# Patient Record
Sex: FEMALE | Race: WHITE | Employment: FULL TIME | URBAN - METROPOLITAN AREA
[De-identification: names, ages, dates, MRNs, and addresses within clinical notes are randomized per-mention and may not be internally consistent; named-entity substitution may affect disease eponyms.]

---

## 2019-11-04 LAB
HBSAG, EXTERNAL: NEGATIVE
HIV, EXTERNAL: NORMAL
RPR, EXTERNAL: NORMAL
RUBELLA, EXTERNAL: NORMAL

## 2020-05-07 LAB — GRBS, EXTERNAL: NEGATIVE

## 2020-06-07 ENCOUNTER — HOSPITAL ENCOUNTER (INPATIENT)
Age: 25
LOS: 4 days | Discharge: HOME | DRG: 560 | End: 2020-06-11
Attending: OBSTETRICS & GYNECOLOGY | Admitting: OBSTETRICS & GYNECOLOGY
Payer: MEDICAID

## 2020-06-07 PROBLEM — Z3A.40 40 WEEKS GESTATION OF PREGNANCY: Status: ACTIVE | Noted: 2020-06-07

## 2020-06-07 PROBLEM — Z34.90 ENCOUNTER FOR PLANNED INDUCTION OF LABOR: Status: ACTIVE | Noted: 2020-06-07

## 2020-06-07 LAB
ABO + RH BLD: NORMAL
ALBUMIN SERPL-MCNC: 2.7 G/DL (ref 3.5–5)
ALBUMIN/GLOB SERPL: 0.7 {RATIO} (ref 1.2–3.5)
ALP SERPL-CCNC: 166 U/L (ref 50–136)
ALT SERPL-CCNC: 11 U/L (ref 12–65)
ANION GAP SERPL CALC-SCNC: 8 MMOL/L (ref 7–16)
AST SERPL-CCNC: 11 U/L (ref 15–37)
BASOPHILS # BLD: 0 K/UL (ref 0–0.2)
BASOPHILS NFR BLD: 0 % (ref 0–2)
BILIRUB SERPL-MCNC: 0.3 MG/DL (ref 0.2–1.1)
BLOOD GROUP ANTIBODIES SERPL: NORMAL
BUN SERPL-MCNC: 9 MG/DL (ref 6–23)
CALCIUM SERPL-MCNC: 8.7 MG/DL (ref 8.3–10.4)
CHLORIDE SERPL-SCNC: 107 MMOL/L (ref 98–107)
CO2 SERPL-SCNC: 21 MMOL/L (ref 21–32)
CREAT SERPL-MCNC: 0.56 MG/DL (ref 0.6–1)
DIFFERENTIAL METHOD BLD: ABNORMAL
EOSINOPHIL # BLD: 0.1 K/UL (ref 0–0.8)
EOSINOPHIL NFR BLD: 1 % (ref 0.5–7.8)
ERYTHROCYTE [DISTWIDTH] IN BLOOD BY AUTOMATED COUNT: 13 % (ref 11.9–14.6)
GLOBULIN SER CALC-MCNC: 3.7 G/DL (ref 2.3–3.5)
GLUCOSE SERPL-MCNC: 105 MG/DL (ref 65–100)
HCT VFR BLD AUTO: 32.6 % (ref 35.8–46.3)
HGB BLD-MCNC: 10.9 G/DL (ref 11.7–15.4)
IMM GRANULOCYTES # BLD AUTO: 0.1 K/UL (ref 0–0.5)
IMM GRANULOCYTES NFR BLD AUTO: 1 % (ref 0–5)
LYMPHOCYTES # BLD: 2.1 K/UL (ref 0.5–4.6)
LYMPHOCYTES NFR BLD: 20 % (ref 13–44)
MCH RBC QN AUTO: 30.3 PG (ref 26.1–32.9)
MCHC RBC AUTO-ENTMCNC: 33.4 G/DL (ref 31.4–35)
MCV RBC AUTO: 90.6 FL (ref 79.6–97.8)
MONOCYTES # BLD: 0.9 K/UL (ref 0.1–1.3)
MONOCYTES NFR BLD: 8 % (ref 4–12)
NEUTS SEG # BLD: 7.6 K/UL (ref 1.7–8.2)
NEUTS SEG NFR BLD: 71 % (ref 43–78)
NRBC # BLD: 0 K/UL (ref 0–0.2)
PLATELET # BLD AUTO: 218 K/UL (ref 150–450)
PMV BLD AUTO: 11.6 FL (ref 9.4–12.3)
POTASSIUM SERPL-SCNC: 3.7 MMOL/L (ref 3.5–5.1)
PROT SERPL-MCNC: 6.4 G/DL (ref 6.3–8.2)
RBC # BLD AUTO: 3.6 M/UL (ref 4.05–5.2)
SODIUM SERPL-SCNC: 136 MMOL/L (ref 136–145)
SPECIMEN EXP DATE BLD: NORMAL
WBC # BLD AUTO: 10.8 K/UL (ref 4.3–11.1)

## 2020-06-07 PROCEDURE — 65270000029 HC RM PRIVATE

## 2020-06-07 PROCEDURE — 86900 BLOOD TYPING SEROLOGIC ABO: CPT

## 2020-06-07 PROCEDURE — 3E0P7VZ INTRODUCTION OF HORMONE INTO FEMALE REPRODUCTIVE, VIA NATURAL OR ARTIFICIAL OPENING: ICD-10-PCS | Performed by: OBSTETRICS & GYNECOLOGY

## 2020-06-07 PROCEDURE — 36415 COLL VENOUS BLD VENIPUNCTURE: CPT

## 2020-06-07 PROCEDURE — 80053 COMPREHEN METABOLIC PANEL: CPT

## 2020-06-07 PROCEDURE — 74011250637 HC RX REV CODE- 250/637: Performed by: OBSTETRICS & GYNECOLOGY

## 2020-06-07 PROCEDURE — 85025 COMPLETE CBC W/AUTO DIFF WBC: CPT

## 2020-06-07 RX ORDER — BUTORPHANOL TARTRATE 2 MG/ML
1 INJECTION INTRAMUSCULAR; INTRAVENOUS
Status: DISCONTINUED | OUTPATIENT
Start: 2020-06-07 | End: 2020-06-10 | Stop reason: HOSPADM

## 2020-06-07 RX ORDER — SODIUM CHLORIDE 0.9 % (FLUSH) 0.9 %
5-40 SYRINGE (ML) INJECTION EVERY 8 HOURS
Status: DISCONTINUED | OUTPATIENT
Start: 2020-06-07 | End: 2020-06-10 | Stop reason: ALTCHOICE

## 2020-06-07 RX ORDER — SODIUM CHLORIDE 0.9 % (FLUSH) 0.9 %
5-40 SYRINGE (ML) INJECTION AS NEEDED
Status: DISCONTINUED | OUTPATIENT
Start: 2020-06-07 | End: 2020-06-10 | Stop reason: ALTCHOICE

## 2020-06-07 RX ORDER — LIDOCAINE HYDROCHLORIDE 20 MG/ML
JELLY TOPICAL
Status: ACTIVE | OUTPATIENT
Start: 2020-06-07 | End: 2020-06-08

## 2020-06-07 RX ORDER — LIDOCAINE HYDROCHLORIDE 10 MG/ML
1 INJECTION INFILTRATION; PERINEURAL
Status: ACTIVE | OUTPATIENT
Start: 2020-06-07 | End: 2020-06-08

## 2020-06-07 RX ORDER — DEXTROSE, SODIUM CHLORIDE, SODIUM LACTATE, POTASSIUM CHLORIDE, AND CALCIUM CHLORIDE 5; .6; .31; .03; .02 G/100ML; G/100ML; G/100ML; G/100ML; G/100ML
125 INJECTION, SOLUTION INTRAVENOUS CONTINUOUS
Status: DISCONTINUED | OUTPATIENT
Start: 2020-06-07 | End: 2020-06-10 | Stop reason: ALTCHOICE

## 2020-06-07 RX ORDER — MINERAL OIL
120 OIL (ML) ORAL
Status: ACTIVE | OUTPATIENT
Start: 2020-06-07 | End: 2020-06-08

## 2020-06-07 RX ORDER — OXYTOCIN/RINGER'S LACTATE 30/500 ML
250 PLASTIC BAG, INJECTION (ML) INTRAVENOUS ONCE
Status: ACTIVE | OUTPATIENT
Start: 2020-06-07 | End: 2020-06-08

## 2020-06-07 RX ORDER — ONDANSETRON HYDROCHLORIDE 8 MG/1
8 TABLET, FILM COATED ORAL
COMMUNITY

## 2020-06-07 RX ORDER — OXYTOCIN/RINGER'S LACTATE 30/500 ML
0-25 PLASTIC BAG, INJECTION (ML) INTRAVENOUS
Status: DISCONTINUED | OUTPATIENT
Start: 2020-06-07 | End: 2020-06-10 | Stop reason: HOSPADM

## 2020-06-07 RX ADMIN — MISOPROSTOL 25 MCG: 100 TABLET ORAL at 20:42

## 2020-06-07 NOTE — PROGRESS NOTES
Pt admitted to room 430 for cytotec induction. Consents signed, iv started and admission complete. Report given to Dena Gabriel RN, care relinquished.

## 2020-06-08 PROBLEM — O48.0 POST-TERM PREGNANCY, 40-42 WEEKS OF GESTATION: Status: ACTIVE | Noted: 2020-06-08

## 2020-06-08 PROCEDURE — 74011250637 HC RX REV CODE- 250/637: Performed by: OBSTETRICS & GYNECOLOGY

## 2020-06-08 PROCEDURE — 74011250636 HC RX REV CODE- 250/636: Performed by: OBSTETRICS & GYNECOLOGY

## 2020-06-08 PROCEDURE — 74011000250 HC RX REV CODE- 250: Performed by: OBSTETRICS & GYNECOLOGY

## 2020-06-08 PROCEDURE — 65270000029 HC RM PRIVATE

## 2020-06-08 RX ORDER — ZOLPIDEM TARTRATE 5 MG/1
5 TABLET ORAL
Status: DISCONTINUED | OUTPATIENT
Start: 2020-06-08 | End: 2020-06-11 | Stop reason: HOSPADM

## 2020-06-08 RX ADMIN — MISOPROSTOL 25 MCG: 100 TABLET ORAL at 00:48

## 2020-06-08 RX ADMIN — MISOPROSTOL 50 MCG: 100 TABLET ORAL at 23:37

## 2020-06-08 RX ADMIN — PROMETHAZINE HYDROCHLORIDE 12.5 MG: 25 INJECTION INTRAMUSCULAR; INTRAVENOUS at 21:04

## 2020-06-08 RX ADMIN — SODIUM CHLORIDE, SODIUM LACTATE, POTASSIUM CHLORIDE, CALCIUM CHLORIDE, AND DEXTROSE MONOHYDRATE 125 ML/HR: 600; 310; 30; 20; 5 INJECTION, SOLUTION INTRAVENOUS at 08:58

## 2020-06-08 RX ADMIN — MISOPROSTOL 50 MCG: 100 TABLET ORAL at 19:21

## 2020-06-08 RX ADMIN — Medication 2 MILLI-UNITS/MIN: at 08:58

## 2020-06-08 RX ADMIN — MISOPROSTOL 50 MCG: 100 TABLET ORAL at 05:00

## 2020-06-08 RX ADMIN — BUTORPHANOL TARTRATE 1 MG: 2 INJECTION, SOLUTION INTRAMUSCULAR; INTRAVENOUS at 16:20

## 2020-06-08 RX ADMIN — SODIUM CHLORIDE, SODIUM LACTATE, POTASSIUM CHLORIDE, CALCIUM CHLORIDE, AND DEXTROSE MONOHYDRATE 125 ML/HR: 600; 310; 30; 20; 5 INJECTION, SOLUTION INTRAVENOUS at 16:27

## 2020-06-08 NOTE — PROGRESS NOTES
Labor Progress Note  Patient seen, fetal heart rate and contraction pattern evaluated, patient examined. Patient Vitals for the past 1 hrs:   BP Pulse   06/08/20 1230 112/60 (!) 53       Physical Exam:  Cervical Exam:  0 cm dilated    90% effaced    -2 station    Membranes:  Intact  Uterine Activity: Intensity: mild  Fetal Heart Rate: Reactive    Assessment/Plan:  Reassuring fetal status, Continue plan for vaginal delivery, Stil unalbe to get inside os to AROM. Will continue increasing pitocin, If no change at 5, will stop pitocin, options of C/S verse second day discussed.   Wanting a second day, would have her eat dinner and then repeat Cytotec over night to resume pitocin in am.

## 2020-06-08 NOTE — PROGRESS NOTES
FHTs not tracing well, maternal position change, ultrasound adjusted frequently. Patient c/o cramping pain and requests pain medication. SVE tight 1. See flowsheet.

## 2020-06-08 NOTE — PROGRESS NOTES
8061 Dr. Cali Dunn updated on patient status, SVE per flowsheet, pitocin per [facility], UCs per strip, patient request for pain medication. MD ok with patient to receive Stadol. POC to be discussed with patient if patient wishes to continue with pitocin or d/c pitocin at 1700, eat dinner and restart cytotec as discussed earlier,  per MD order. 1620 Stadol given, see MAR.     1720 POC discussed with patient and significant other. Patient wishes to d/c pitocin, eat dinner, and restart cytotec tonight. 1722 Pitocin d/c. See MAR. Patient will order dinner. 26 Dr. Cali Dunn updated on patient status and patient decision.  MD orders received for cytotec 50mcg x3 starting at 1900 and pitocin to be started at 0700am.

## 2020-06-08 NOTE — PROGRESS NOTES
Cytotec 25 mcg given buccal as per order. Patient denies feeling any contractions or pain. Call bell within reach of patient. Patient denies any further needs at this time.

## 2020-06-08 NOTE — H&P
History & Physical    Name: Tariq Hammonds MRN: [id number]  SSN: [id number]    YOB: 1995  Age: 27 y.o. Sex: female      Subjective:     Estimated Date of Delivery: 20  OB History    Para Term  AB Living   1             SAB TAB Ectopic Molar Multiple Live Births                    # Outcome Date GA Lbr Jimmie/2nd Weight Sex Delivery Anes PTL Lv   1 Current                Ms. Sariah Chauhan is admitted with pregnancy at 40w4d for induction of labor due to post dates. Prenatal course was normal.  Please see prenatal records for details. Past Medical History:   Diagnosis Date    Psychiatric problem      History reviewed. No pertinent surgical history. Social History     Occupational History    Not on file   Tobacco Use    Smoking status: Current Every Day Smoker     Packs/day: 0.25   Substance and Sexual Activity    Alcohol use: Not Currently    Drug use: Never    Sexual activity: Yes     History reviewed. No pertinent family history. Allergies   Allergen Reactions    Bactrim [Sulfamethoprim] Rash     Prior to Admission medications    Medication Sig Start Date End Date Taking? Authorizing Provider   ondansetron hcl (Zofran) 8 mg tablet Take 8 mg by mouth every eight (8) hours as needed for Nausea or Vomiting. Yes Provider, Historical   PNV No12-Iron-FA-DSS-OM-3 29 mg iron-1 mg -50 mg CPKD Take  by mouth. Provider, Historical        Review of Systems: A comprehensive review of systems was negative except for that written in the History of Present Illness. Objective:     Vitals:  Vitals:    20 1843 20 1845 20 1910 20 0501   BP:  108/65 111/62 112/61   Pulse:  (!) 105 95 62   Resp:  16 16 16   Temp:  98.5 °F (36.9 °C) 98.3 °F (36.8 °C) 98.3 °F (36.8 °C)   Weight: 93.9 kg (207 lb)      Height: 5' 7\" (1.702 m)           Physical Exam:  Patient without distress.   Heart: Regular rate and rhythm  Lung: clear to auscultation throughout lung fields, no wheezes, no rales, no rhonchi and normal respiratory effort  Abdomen: soft, nontender  Cervical Exam: 0 cm dilated    70% effaced    -3 station    Membranes:  Intact  Fetal Heart Rate: Reactive          Prenatal Labs:   Lab Results   Component Value Date/Time    ABO/Rh(D) O POSITIVE 06/07/2020 06:58 PM    Rubella, External Immune 11/04/2019    HBsAg, External Negative 11/04/2019    HIV, External Non-Reactive 11/04/2019    RPR, External Non-Reactive 11/04/2019    GrBStrep, External Negative 05/07/2020       Impression/Plan:     Principal Problem:    Encounter for planned induction of labor (6/7/2020)    Active Problems:    40 weeks gestation of pregnancy (6/7/2020)      Post-term pregnancy, 40-42 weeks of gestation (6/8/2020)         Plan: Admit for induction of labor. Group B Strep negative.

## 2020-06-08 NOTE — PROGRESS NOTES
Dr. Virgilio Cedeño at [facility] No further orders received at this time.

## 2020-06-09 ENCOUNTER — ANESTHESIA (OUTPATIENT)
Dept: LABOR AND DELIVERY | Age: 25
DRG: 560 | End: 2020-06-09
Payer: MEDICAID

## 2020-06-09 ENCOUNTER — ANESTHESIA EVENT (OUTPATIENT)
Dept: LABOR AND DELIVERY | Age: 25
DRG: 560 | End: 2020-06-09
Payer: MEDICAID

## 2020-06-09 PROCEDURE — 10907ZC DRAINAGE OF AMNIOTIC FLUID, THERAPEUTIC FROM PRODUCTS OF CONCEPTION, VIA NATURAL OR ARTIFICIAL OPENING: ICD-10-PCS | Performed by: OBSTETRICS & GYNECOLOGY

## 2020-06-09 PROCEDURE — 74011000250 HC RX REV CODE- 250: Performed by: OBSTETRICS & GYNECOLOGY

## 2020-06-09 PROCEDURE — A4300 CATH IMPL VASC ACCESS PORTAL: HCPCS | Performed by: NURSE ANESTHETIST, CERTIFIED REGISTERED

## 2020-06-09 PROCEDURE — 77030014125 HC TY EPDRL BBMI -B: Performed by: NURSE ANESTHETIST, CERTIFIED REGISTERED

## 2020-06-09 PROCEDURE — 65270000029 HC RM PRIVATE

## 2020-06-09 PROCEDURE — 74011000250 HC RX REV CODE- 250: Performed by: ANESTHESIOLOGY

## 2020-06-09 PROCEDURE — 77030019905 HC CATH URETH INTMIT MDII -A

## 2020-06-09 PROCEDURE — 74011250636 HC RX REV CODE- 250/636: Performed by: OBSTETRICS & GYNECOLOGY

## 2020-06-09 PROCEDURE — 74011250636 HC RX REV CODE- 250/636: Performed by: NURSE ANESTHETIST, CERTIFIED REGISTERED

## 2020-06-09 PROCEDURE — 74011250637 HC RX REV CODE- 250/637: Performed by: OBSTETRICS & GYNECOLOGY

## 2020-06-09 PROCEDURE — 3E0P7VZ INTRODUCTION OF HORMONE INTO FEMALE REPRODUCTIVE, VIA NATURAL OR ARTIFICIAL OPENING: ICD-10-PCS | Performed by: OBSTETRICS & GYNECOLOGY

## 2020-06-09 RX ORDER — FAMOTIDINE 20 MG/1
20 TABLET, FILM COATED ORAL ONCE
Status: ACTIVE | OUTPATIENT
Start: 2020-06-09 | End: 2020-06-10

## 2020-06-09 RX ORDER — SODIUM CHLORIDE 0.9 % (FLUSH) 0.9 %
5-40 SYRINGE (ML) INJECTION AS NEEDED
Status: DISCONTINUED | OUTPATIENT
Start: 2020-06-09 | End: 2020-06-10 | Stop reason: HOSPADM

## 2020-06-09 RX ORDER — FENTANYL CITRATE 50 UG/ML
INJECTION, SOLUTION INTRAMUSCULAR; INTRAVENOUS AS NEEDED
Status: DISCONTINUED | OUTPATIENT
Start: 2020-06-09 | End: 2020-06-10 | Stop reason: HOSPADM

## 2020-06-09 RX ORDER — ROPIVACAINE HYDROCHLORIDE 2 MG/ML
INJECTION, SOLUTION EPIDURAL; INFILTRATION; PERINEURAL
Status: DISCONTINUED | OUTPATIENT
Start: 2020-06-09 | End: 2020-06-10 | Stop reason: HOSPADM

## 2020-06-09 RX ORDER — LIDOCAINE HYDROCHLORIDE AND EPINEPHRINE 15; 5 MG/ML; UG/ML
INJECTION, SOLUTION EPIDURAL
Status: COMPLETED | OUTPATIENT
Start: 2020-06-09 | End: 2020-06-09

## 2020-06-09 RX ORDER — SODIUM CHLORIDE 0.9 % (FLUSH) 0.9 %
5-40 SYRINGE (ML) INJECTION EVERY 8 HOURS
Status: DISCONTINUED | OUTPATIENT
Start: 2020-06-09 | End: 2020-06-10 | Stop reason: HOSPADM

## 2020-06-09 RX ADMIN — PROMETHAZINE HYDROCHLORIDE 12.5 MG: 25 INJECTION INTRAMUSCULAR; INTRAVENOUS at 02:58

## 2020-06-09 RX ADMIN — Medication 18 MILLI-UNITS/MIN: at 21:35

## 2020-06-09 RX ADMIN — SODIUM CHLORIDE, SODIUM LACTATE, POTASSIUM CHLORIDE, CALCIUM CHLORIDE, AND DEXTROSE MONOHYDRATE 125 ML/HR: 600; 310; 30; 20; 5 INJECTION, SOLUTION INTRAVENOUS at 07:59

## 2020-06-09 RX ADMIN — PROMETHAZINE HYDROCHLORIDE 12.5 MG: 25 INJECTION INTRAMUSCULAR; INTRAVENOUS at 10:36

## 2020-06-09 RX ADMIN — LIDOCAINE HYDROCHLORIDE,EPINEPHRINE BITARTRATE 5 ML: 15; .005 INJECTION, SOLUTION EPIDURAL; INFILTRATION; INTRACAUDAL; PERINEURAL at 13:29

## 2020-06-09 RX ADMIN — ROPIVACAINE HYDROCHLORIDE 10 ML/HR: 2 INJECTION, SOLUTION EPIDURAL; INFILTRATION at 13:30

## 2020-06-09 RX ADMIN — FENTANYL CITRATE 100 MCG: 50 INJECTION INTRAMUSCULAR; INTRAVENOUS at 23:57

## 2020-06-09 RX ADMIN — MISOPROSTOL 50 MCG: 100 TABLET ORAL at 04:06

## 2020-06-09 RX ADMIN — SODIUM CHLORIDE, SODIUM LACTATE, POTASSIUM CHLORIDE, CALCIUM CHLORIDE, AND DEXTROSE MONOHYDRATE 125 ML/HR: 600; 310; 30; 20; 5 INJECTION, SOLUTION INTRAVENOUS at 15:52

## 2020-06-09 RX ADMIN — Medication 2 MILLI-UNITS/MIN: at 07:59

## 2020-06-09 NOTE — PROGRESS NOTES
MD messaged for additonal nausea medicaton for patient continued vomiting.  Awaiting return communication

## 2020-06-09 NOTE — PROGRESS NOTES
Labor Progress Note  Patient seen, fetal heart rate and contraction pattern evaluated, patient examined. Starting her second day IOL. Little response from the cytotec. Have resume Pitocin. No data found. Physical Exam:  Cervical Exam:  1.5 cm dilated    70% effaced    -2-3 station    Membranes:  Artificial Rupture of Membranes;  Amniotic Fluid: medium amount of clear fluid  Uterine Activity: Intensity: mild  Fetal Heart Rate: Reactive    Assessment/Plan:  Reassuring fetal status, Continue plan for vaginal delivery

## 2020-06-09 NOTE — PROGRESS NOTES
Shift assessment complete, see flowsheet for complete assessment. Abdomen palpated soft and non-tender. +FM. POC discussed with patient, verbalizes understanding and agreement. Denies any needs, questions, or concerns. Call light within reach. Patient resting in bed with significant other at bedside.

## 2020-06-09 NOTE — PROGRESS NOTES
Wes Lr at bedside at 1320. PRIYANKA Wood at bedside at 1320    Assisted pt to sitting up on bedside at 1320. Timeout completed at 36 with MD, PRIYANKA and myself at bedside. Test dose given at 1327. Negative reaction. Dose given at 1331. Pt assisted to lying back in left tilt position. See anesthesia record for details. See vital sign flow sheet for BP. Tolerated procedure well.

## 2020-06-09 NOTE — PROGRESS NOTES
Received care of pt from [address] [place] [address]. Plan of care reviewed, verbalizes understanding.

## 2020-06-09 NOTE — ANESTHESIA PREPROCEDURE EVALUATION
Relevant Problems   No relevant active problems       Anesthetic History               Review of Systems / Medical History  Patient summary reviewed, nursing notes reviewed and pertinent labs reviewed    Pulmonary                   Neuro/Psych              Cardiovascular                  Exercise tolerance: >4 METS     GI/Hepatic/Renal                Endo/Other             Other Findings              Physical Exam    Airway  Mallampati: II  TM Distance: 4 - 6 cm  Neck ROM: normal range of motion   Mouth opening: Normal     Cardiovascular  Regular rate and rhythm,  S1 and S2 normal,  no murmur, click, rub, or gallop             Dental  No notable dental hx       Pulmonary  Breath sounds clear to auscultation               Abdominal         Other Findings            Anesthetic Plan    ASA: 2  Anesthesia type: epidural          Induction: Intravenous  Anesthetic plan and risks discussed with: Patient

## 2020-06-09 NOTE — PROGRESS NOTES
Dr Radha Uriarte at nurses station. Strip reviewed. MD notified of [place] 2/80/-2. Orders for epidural.  IV bolus infusing.

## 2020-06-09 NOTE — PROGRESS NOTES
SBAR report received from Saeid Richard RN. Patient care assumed. Patient sleeping in bed with significant other at bedside.

## 2020-06-09 NOTE — PROGRESS NOTES
Dr. Andres Melchor updated on patient status, SVE per flowsheet, pitocin per [facility], FHTs per strip, maternal position changes, LR fluid bolus given. MD reviewed strip. Orders received to keep patient on 16 mu/min of pitocin per MAR.

## 2020-06-09 NOTE — PROGRESS NOTES
Dr. Radha Uriarte at bedside, strip reviewed by MD. EDENILSON per MD with clear fluid.  SVE 1.5/70 per MD.

## 2020-06-09 NOTE — PROGRESS NOTES
Shift assessment completed per flow sheet. Pt denies complaints of LOF, vaginal bleeding,  HA, epigastric pain, blurred vision. See flowsheet for details. POC reviewed with pt and pt verbalized understanding. Pt oriented to room and has call light within reach. Pt denies any needs at this time but will call out PRN. Pt now resting in bed with family at bedside.

## 2020-06-09 NOTE — ANESTHESIA PROCEDURE NOTES
Epidural Block    Start time: 6/9/2020 1:19 PM  End time: 6/9/2020 1:26 PM  Performed by: Armand Sutton MD  Authorized by: Armand Sutton MD     Pre-Procedure  Indication: labor epidural    Preanesthetic Checklist: patient identified, risks and benefits discussed, anesthesia consent, site marked, patient being monitored, timeout performed and anesthesia consent    Timeout Time: 13:19        Epidural:   Patient position:  Seated  Prep region:  Lumbar  Prep: Chlorhexidine    Location:  L3-4    Needle and Epidural Catheter:   Needle Type:  Tuohy  Needle Gauge:  17 G  Injection Technique:  Loss of resistance using air  Attempts:  1  Catheter Size:  18 G  Depth in Epidural Space (cm):  3  Events: no blood with aspiration, no cerebrospinal fluid with aspiration, no paresthesia and negative aspiration test    Test Dose:  Negative (.75% LIDOCAINE WITH EPI)    Assessment:   Catheter Secured:  Tegaderm and tape  Insertion:  Uncomplicated  Patient tolerance:  Patient tolerated the procedure well with no immediate complications

## 2020-06-09 NOTE — PROGRESS NOTES
Labor Progress Note  Patient seen, fetal heart rate and contraction pattern evaluated, patient examined. Patient Vitals for the past 1 hrs:   BP Temp Pulse Resp   06/09/20 1249 114/63  (!) 56    06/09/20 1233 113/58  81    06/09/20 1217 104/55  60    06/09/20 1202 106/54 98.7 °F (37.1 °C) (!) 58 20       Physical Exam:  Cervical Exam:  2 cm dilated    80% effaced    -2 station    Membranes:  Artificial Rupture of Membranes;  Amniotic Fluid: medium amount of clear fluid  Uterine Activity: Intensity: moderate  Fetal Heart Rate: Reactive    Assessment/Plan:  Reassuring fetal status, Continue plan for vaginal delivery

## 2020-06-09 NOTE — PROGRESS NOTES
Dr. Andres Melchor updated on patient status, SVE per flowsheet, FHTs per strip with minimal-moderate variability. No new orders received.

## 2020-06-09 NOTE — PROGRESS NOTES
Labor Progress Note  Patient seen, fetal heart rate and contraction pattern evaluated, patient examined. No data found. Physical Exam:  Cervical Exam:  6 cm dilated    100% effaced    0 station    Membranes:  Artificial Rupture of Membranes;  Amniotic Fluid: medium amount of clear fluid  Uterine Activity: Intensity: strong  Fetal Heart Rate: Reactive  Decelerations: variable  Cat 2, +FSS, progressing,     Assessment/Plan:  Reassuring fetal status, Continue plan for vaginal delivery

## 2020-06-10 LAB
ARTERIAL PATENCY WRIST A: ABNORMAL
ARTERIAL PATENCY WRIST A: ABNORMAL
BASE DEFICIT BLD-SCNC: 4 MMOL/L
BASE DEFICIT BLD-SCNC: 4 MMOL/L
BDY SITE: ABNORMAL
BDY SITE: ABNORMAL
CA-I BLD-MCNC: 1.69 MMOL/L (ref 1.12–1.32)
CA-I BLD-MCNC: 1.71 MMOL/L (ref 1.12–1.32)
COLLECT TIME,HTIME: 227
COLLECT TIME,HTIME: 227
GAS FLOW.O2 O2 DELIVERY SYS: ABNORMAL L/MIN
GAS FLOW.O2 O2 DELIVERY SYS: ABNORMAL L/MIN
GLUCOSE BLD STRIP.AUTO-MCNC: 105 MG/DL (ref 65–100)
GLUCOSE BLD STRIP.AUTO-MCNC: 86 MG/DL (ref 65–100)
HCO3 BLD-SCNC: 20.6 MMOL/L (ref 22–26)
HCO3 BLD-SCNC: 24.8 MMOL/L (ref 22–26)
O2/TOTAL GAS SETTING VFR VENT: 21 %
O2/TOTAL GAS SETTING VFR VENT: 21 %
PCO2 BLD: 37.2 MMHG (ref 35–45)
PCO2 BLD: 55.8 MMHG (ref 35–45)
PH BLD: 7.26 [PH] (ref 7.35–7.45)
PH BLD: 7.35 [PH] (ref 7.35–7.45)
PO2 BLD: 10 MMHG (ref 75–100)
PO2 BLD: 26 MMHG (ref 75–100)
POTASSIUM BLD-SCNC: 5.1 MMOL/L (ref 3.5–5.1)
POTASSIUM BLD-SCNC: 6.4 MMOL/L (ref 3.5–5.1)
SAO2 % BLD: 45 % (ref 95–98)
SAO2 % BLD: 8 % (ref 95–98)
SERVICE CMNT-IMP: ABNORMAL
SERVICE CMNT-IMP: ABNORMAL
SODIUM BLD-SCNC: 134 MMOL/L (ref 136–145)
SODIUM BLD-SCNC: 134 MMOL/L (ref 136–145)
SPECIMEN TYPE: ABNORMAL
SPECIMEN TYPE: ABNORMAL

## 2020-06-10 PROCEDURE — 76060000078 HC EPIDURAL ANESTHESIA

## 2020-06-10 PROCEDURE — 75410000003 HC RECOV DEL/VAG/CSECN EA 0.5 HR

## 2020-06-10 PROCEDURE — 0KQM0ZZ REPAIR PERINEUM MUSCLE, OPEN APPROACH: ICD-10-PCS | Performed by: OBSTETRICS & GYNECOLOGY

## 2020-06-10 PROCEDURE — 65270000029 HC RM PRIVATE

## 2020-06-10 PROCEDURE — 82803 BLOOD GASES ANY COMBINATION: CPT

## 2020-06-10 PROCEDURE — 77030019905 HC CATH URETH INTMIT MDII -A

## 2020-06-10 PROCEDURE — 82947 ASSAY GLUCOSE BLOOD QUANT: CPT

## 2020-06-10 PROCEDURE — 77030002888 HC SUT CHRMC J&J -A

## 2020-06-10 PROCEDURE — 74011250637 HC RX REV CODE- 250/637: Performed by: OBSTETRICS & GYNECOLOGY

## 2020-06-10 PROCEDURE — 77030018846 HC SOL IRR STRL H20 ICUM -A

## 2020-06-10 PROCEDURE — 77030003028 HC SUT VCRL J&J -A

## 2020-06-10 PROCEDURE — 75410000002 HC LABOR FEE PER 1 HR

## 2020-06-10 PROCEDURE — 75410000000 HC DELIVERY VAGINAL/SINGLE

## 2020-06-10 RX ORDER — SIMETHICONE 80 MG
80 TABLET,CHEWABLE ORAL
Status: DISCONTINUED | OUTPATIENT
Start: 2020-06-10 | End: 2020-06-11 | Stop reason: HOSPADM

## 2020-06-10 RX ORDER — MINERAL OIL
120 OIL (ML) ORAL AS NEEDED
Status: DISCONTINUED | OUTPATIENT
Start: 2020-06-10 | End: 2020-06-10 | Stop reason: ALTCHOICE

## 2020-06-10 RX ORDER — IBUPROFEN 600 MG/1
600 TABLET ORAL
Status: DISCONTINUED | OUTPATIENT
Start: 2020-06-10 | End: 2020-06-11 | Stop reason: HOSPADM

## 2020-06-10 RX ORDER — OXYCODONE AND ACETAMINOPHEN 5; 325 MG/1; MG/1
2 TABLET ORAL
Status: DISCONTINUED | OUTPATIENT
Start: 2020-06-10 | End: 2020-06-11 | Stop reason: HOSPADM

## 2020-06-10 RX ORDER — DIPHENHYDRAMINE HCL 25 MG
25 CAPSULE ORAL
Status: DISCONTINUED | OUTPATIENT
Start: 2020-06-10 | End: 2020-06-11 | Stop reason: HOSPADM

## 2020-06-10 RX ORDER — OXYCODONE AND ACETAMINOPHEN 5; 325 MG/1; MG/1
1 TABLET ORAL
Status: DISCONTINUED | OUTPATIENT
Start: 2020-06-10 | End: 2020-06-11 | Stop reason: HOSPADM

## 2020-06-10 RX ORDER — ONDANSETRON 4 MG/1
4 TABLET, ORALLY DISINTEGRATING ORAL
Status: ACTIVE | OUTPATIENT
Start: 2020-06-10 | End: 2020-06-11

## 2020-06-10 RX ORDER — DOCUSATE SODIUM 100 MG/1
100 CAPSULE, LIQUID FILLED ORAL 2 TIMES DAILY
Status: DISCONTINUED | OUTPATIENT
Start: 2020-06-10 | End: 2020-06-11 | Stop reason: HOSPADM

## 2020-06-10 RX ADMIN — OXYCODONE HYDROCHLORIDE AND ACETAMINOPHEN 1 TABLET: 5; 325 TABLET ORAL at 17:16

## 2020-06-10 RX ADMIN — IBUPROFEN 600 MG: 600 TABLET, FILM COATED ORAL at 12:01

## 2020-06-10 RX ADMIN — MINERAL OIL 120 ML: 471.95 OIL ORAL at 01:45

## 2020-06-10 RX ADMIN — DOCUSATE SODIUM 100 MG: 100 CAPSULE, LIQUID FILLED ORAL at 17:16

## 2020-06-10 RX ADMIN — WITCH HAZEL 1 PAD: 500 SOLUTION RECTAL; TOPICAL at 17:16

## 2020-06-10 RX ADMIN — Medication 1 SPRAY: at 17:16

## 2020-06-10 RX ADMIN — IBUPROFEN 600 MG: 600 TABLET, FILM COATED ORAL at 19:38

## 2020-06-10 RX ADMIN — DOCUSATE SODIUM 100 MG: 100 CAPSULE, LIQUID FILLED ORAL at 12:01

## 2020-06-10 RX ADMIN — IBUPROFEN 600 MG: 600 TABLET, FILM COATED ORAL at 05:56

## 2020-06-10 NOTE — PROGRESS NOTES
SBAR OUT Report: Mother    Verbal report given to Shae Kennedy (full name & credentials) on this patient, who is now being transferred to MIU (unit) for routine progression of care. The patient is not wearing a green \"Anesthesia-Duramorph\" band. Report consisted of patient's Situation, Background, Assessment and Recommendations (SBAR).  ID bands were compared with the identification form, and verified with the patient and receiving nurse. Information from the SBAR, Kardex, Procedure Summary and Intake/Output and the [place] Report was reviewed with the receiving nurse; opportunity for questions and clarification provided. Fundal Assessment done with Vida Montalvo RN.

## 2020-06-10 NOTE — ANESTHESIA POSTPROCEDURE EVALUATION
* No procedures listed *.    epidural    Anesthesia Post Evaluation      Multimodal analgesia: multimodal analgesia used between 6 hours prior to anesthesia start to PACU discharge  Patient location during evaluation: PACU  Patient participation: complete - patient participated  Level of consciousness: awake and awake and alert  Pain management: adequate  Airway patency: patent  Anesthetic complications: no  Cardiovascular status: acceptable  Respiratory status: acceptable  Hydration status: acceptable  Post anesthesia nausea and vomiting:  controlled      INITIAL Post-op Vital signs: No vitals data found for the desired time range.

## 2020-06-10 NOTE — PROGRESS NOTES
Patient left side feeling more comfortable at this time. Patient needs a peanut break per patient. Sat in thrown position at this time. Will continue to monitor the patient.

## 2020-06-10 NOTE — PROGRESS NOTES
Labor Progress Note  Patient seen, fetal heart rate and contraction pattern evaluated, patient examined. Patient Vitals for the past 1 hrs:   BP Temp Pulse Resp   06/09/20 2214 110/70 98.3 °F (36.8 °C) 74 18   06/09/20 2159 112/59  (!) 51        Physical Exam:  Cervical Exam:  8 cm dilated    100% effaced    0 station    Membranes:  Artificial Rupture of Membranes;  Amniotic Fluid: medium amount of clear fluid  Uterine Activity: Intensity: strong  Fetal Heart Rate: Reactive, +FSS    Assessment/Plan:  Reassuring fetal status, Continue plan for vaginal delivery

## 2020-06-10 NOTE — LACTATION NOTE
This note was copied from a baby's chart. Lactation visit. Baby just 15 hours old. Has not latched yet. Attempted a couple of times. No latch. Also tried bottle feeding, no success. Baby has been spitting up and showing no interest in feeding yet per mom and RN. Discussed first 24 hour expectations with mom. Mom states that if baby didn't latch well, she intended to pump once home. Mom has a Spectra personal pump for home use, not here. Information sheet given about Spectra set up. Discussed breast stimulation and supply/demand. Offered to help with latching or with pumping. Mom wants to pump. Pump set up with full instruction on use. Showed mom hands on pumping. Mom pumped 10ml total. High volume for first pumping. Reviewed normal expectations and that amount may fluctuate some. Pump every 3 hours if not latching baby to breast or if baby doesn't latch. Mom agreeable. Suck assessed. Poor suck, disorganized, tongue thrusting. Noted baby appears to have dimpling of center of tongue with extension.

## 2020-06-10 NOTE — PROGRESS NOTES
SBAR IN Report: Mother    Verbal report received from Kev Ni RN (full name & credentials) on this patient, who is now being transferred from L&D (unit) for routine progression of care. The patient is not wearing a green \"Anesthesia-Duramorph\" band. Report consisted of patient's Situation, Background, Assessment and Recommendations (SBAR). [place] ID bands were compared with the identification form, and verified with the patient and transferring nurse. Information from the SBAR, Intake/Output, MAR, Recent Results and Quality Measures and the [place] Report was reviewed with the transferring nurse; opportunity for questions and clarification provided.

## 2020-06-10 NOTE — PROGRESS NOTES
Patient assisted to bathroom. Voided 700 ml without difficulty. Slime care taught. Patient voiced understanding. Patient ambulated back to bed usassisted.

## 2020-06-10 NOTE — LACTATION NOTE

## 2020-06-10 NOTE — L&D DELIVERY NOTE
Delivery Note    Obstetrician:  Chas Craig MD    Assistant: none    Pre-Delivery Diagnosis: Term pregnancy, Induced labor and Single fetus    Post-Delivery Diagnosis: Living  infant(s), Female and named Collin Tee    Intrapartum Event: None    Procedure: Spontaneous vaginal delivery    Epidural: YES      Complications:  none           Cord Blood Results:   Information for the patient's :  Katty Sandoval [[id number]]   No results found for: PCTABR, PCTDIG, BILI, ABORH    Prenatal Labs:     Lab Results   Component Value Date/Time    ABO/Rh(D) O POSITIVE 2020 06:58 PM    HBsAg, External Negative 2019    HIV, External Non-Reactive 2019    Rubella, External Immune 2019    RPR, External Non-Reactive 2019    GrBStrep, External Negative 2020        Pushing with tight band, released with control delivery, bulb suction, cord reduced, shoulder released, and infant to warmer. Placenta intact,  2nd degree repaired, RV ok. Attending Attestation: I was present and scrubbed for the entire procedure         Delivery Summary    Patient: Kristan Cortez MRN: [id number]  SSN: [id number]    YOB: 1995  Age: 27 y.o. Sex: female       Information for the patient's :  Katty Sandoval [[id number]]       Labor Events:    Labor: No    Steroids: None   Cervical Ripening Date/Time: 2020 8:42 PM   Cervical Ripening Type: Misoprostol   Antibiotics During Labor:     Rupture Identifier:      Rupture Date/Time: 2020 8:10 AM   Rupture Type: AROM   Amniotic Fluid Volume: Moderate    Amniotic Fluid Description: Clear    Amniotic Fluid Odor: None    Induction: Prostaglandins; Oxytocin       Induction Date/Time:        Indications for Induction: Post-term Gestation    Augmentation: AROM   Augmentation Date/Time:      Indications for Augmentation: Ineffective Contraction Pattern   Labor complications: None       Additional complications: Delivery Events:  Indications For Episiotomy: Other (See Note)   Episiotomy: Midline   Perineal Laceration(s): 2nd   Repaired: Yes   Periurethral Laceration Location:      Repaired:     Labial Laceration Location:     Repaired:     Sulcal Laceration Location:     Repaired:     Vaginal Laceration Location:     Repaired:     Cervical Laceration Location:     Repaired:     Repair Suture: Rapide 3-0;Vicryl 2-0   Number of Repair Packets: 2   Estimated Blood Loss (ml):  ml   Quantitative Blood Loss (ml)                Delivery Date: 6/10/2020    Delivery Time: 2:27 AM  Delivery Type: Vaginal, Spontaneous  Sex:  Female    Gestational Age: 38w9d   Delivery Clinician:  Elisha Daniels  Living Status: Living   Delivery Location: L&D 430          APGARS  One minute Five minutes Ten minutes   Skin color: 0   1        Heart rate: 2   2        Grimace: 2   2        Muscle tone: 2   2        Breathin   2        Totals: 8   9            Presentation: Vertex    Position: Left Occiput Anterior  Resuscitation Method:  Tactile Stimulation;Suctioning-bulb     Meconium Stained: None      Cord Information: 3 Vessels  Complications: Nuchal Cord With Compressions  Cord around:    Delayed cord clamping? Yes  Cord clamped date/time:   Disposition of Cord Blood:      Blood Gases Sent?: Yes    Placenta:  Date/Time: 6/10/2020  2:31 AM  Removal: Expressed      Appearance: Normal;Intact     [place] Measurements:  Birth Weight: 3.54 kg      Birth Length: 50.8 cm      Head Circumference: 35.6 cm      Chest Circumference: 34.3 cm     Abdominal Girth:       Other Providers:   Caren LINN;NED CHOWDHURY;VANESA CASSIDY;REJI SAMPSON;CHARITO FLOYD, Obstetrician;Primary Nurse;Charge Nurse;Scrub Tech;Scrub Tech           Group B Strep:   Lab Results   Component Value Date/Time    GrBStrep, External Negative 2020     Information for the patient's :  Capitanejoet Nageotte [[id number]]   No results found for: [address], PCTABR, PCTDIG, BILI, ABORHEXT, ABORH    Recent Labs     06/10/20  0243 06/10/20  0236   HCO3I 20.6* 24.8   SO2I 45* 8*   IBD 4 4   SPECTI VENOUS CORD ARTERIAL CORD   ISITE CORD CORD   IDEV ROOM AIR ROOM AIR   IALLEN NOT APPLICABLE NOT APPLICABLE

## 2020-06-10 NOTE — PROGRESS NOTES
Patient complaining of cramping pain on left side. Turned more towards left and pushed epidural button. Will continue to monitoring the patient.

## 2020-06-10 NOTE — PROGRESS NOTES
Postpartum Progress Note (VD)    Patient: Sheela Darden MRN: [id number]  SSN: [id number]    YOB: 1995  Age: 27 y.o. Sex: female      Subjective:     Postpartum Day: 0     Delivery: Spontaneous vaginal delivery    The patient feels well. The patient denies emotional concerns. Pain is  well controlled with current medications. Voiding spontaneous. The patient is ambulating well. The patient is tolerating a normal diet. Vaginal bleeding is moderate, no clots. The baby girl ([place]) is well. Baby is feeding via breast - having difficulty with latch, working with lactation, plans to pump for stimulation until baby latching better. .    Objective:      Patient Vitals for the past 8 hrs:   BP Temp Pulse Resp SpO2   06/10/20 0701 110/76 98.4 °F (36.9 °C) 79 16 99 %   06/10/20 0547 114/76 98.5 °F (36.9 °C) 62 16      General:    alert, cooperative, no distress   Bowel Sounds:  active   Lochia:  appropriate   Uterine Fundus:   firm   Fundus Location:  -1   Perineum:  not inspected   DVT Evaluation:  No evidence of DVT seen on physical exam.     Lab/Data Review: All lab results for the last 24 hours reviewed. Assessment:     Status post: Doing well postpartum vaginal delivery     Plan:     - Postpartum care discussed including diet, ambulation, and actvitiy restrictions.   - Discharge planning for PPD #1-2    Zohreh Pollard MD

## 2020-06-10 NOTE — LACTATION NOTE
This note was copied from a baby's chart. Spectra S1/2:  Power on and press wavy line button to go into let-down mode. Cycle will be 70 (and cannot be changed). Cycle is the number of times the pump pulls per minute. Fast cycling stimulates let-down, slow cycling expresses available milk. Adjust vacuum to comfort. After 2 minutes (or sooner if milk is spraying), press wavy line button again to go into expression mode. Cycle should be between 54, 50 or 46. Again, adjust vacuum to comfort.

## 2020-06-10 NOTE — PROGRESS NOTES
Admission assessment complete as noted. Patient oriented to room and unit. Plan of care reviewed and patient verbalizes understanding. Questions encouraged and answered. Patent encouraged to call for needs or concerns. Safety Teaching reviewed:   1. Hand hygiene prior to handling the infant. 2. Use of bulb syringe. 3. Bracelets with matching numbers are placed on mother and infant  4. An infant security tag  [facility]) is placed on the infant's ankle and monitored  5. All OB nurses wear pink Employee badges - do not give your baby to anyone without proper identification. 6. Never leave the baby alone in the room. 7. The infant should be placed on their back to sleep. on a firm mattress. No toys should be placed in the crib. (safe sleep video offered to view)  8. Never shake the baby (video offered to view)  9. Infant fall prevention - do not sleep with the baby, and place the baby in the crib while ambulating. 8. Mother and Baby Care booklet given to Mother.

## 2020-06-10 NOTE — PROGRESS NOTES
Patient having recurring late decels at this time. Patient turned to  Lateral left side, and pitocin decreased in half.

## 2020-06-11 VITALS
HEART RATE: 62 BPM | RESPIRATION RATE: 16 BRPM | BODY MASS INDEX: 32.49 KG/M2 | TEMPERATURE: 97.7 F | WEIGHT: 207 LBS | OXYGEN SATURATION: 97 % | SYSTOLIC BLOOD PRESSURE: 127 MMHG | DIASTOLIC BLOOD PRESSURE: 85 MMHG | HEIGHT: 67 IN

## 2020-06-11 PROCEDURE — 74011250637 HC RX REV CODE- 250/637: Performed by: OBSTETRICS & GYNECOLOGY

## 2020-06-11 RX ORDER — IBUPROFEN 600 MG/1
600 TABLET ORAL
Qty: 20 TAB | Refills: 1 | Status: SHIPPED | OUTPATIENT
Start: 2020-06-11

## 2020-06-11 RX ORDER — OXYCODONE AND ACETAMINOPHEN 5; 325 MG/1; MG/1
1 TABLET ORAL
Qty: 20 TAB | Refills: 0 | Status: SHIPPED | OUTPATIENT
Start: 2020-06-11 | End: 2020-06-18

## 2020-06-11 RX ADMIN — IBUPROFEN 600 MG: 600 TABLET, FILM COATED ORAL at 06:04

## 2020-06-11 RX ADMIN — DOCUSATE SODIUM 100 MG: 100 CAPSULE, LIQUID FILLED ORAL at 08:35

## 2020-06-11 NOTE — DISCHARGE SUMMARY
Obstetrical Discharge Summary     Name: Hailey Carrillo MRN: [id number]  SSN: [id number]    YOB: 1995  Age: 27 y.o. Sex: female      Allergies: Bactrim [sulfamethoprim]    Admit Date: 2020    Discharge Date: 2020     Admitting Physician: John Paul Brown MD     Attending Physician:  Adolfo Cm MD     * Admission Diagnoses: 40 weeks gestation of pregnancy [Z3A.40]  Encounter for planned induction of labor [Z34.90]    * Discharge Diagnoses:   Information for the patient's :  Chriss Ann [[id number]]   Delivery of a 3.54 kg female infant via Vaginal, Spontaneous on 6/10/2020 at 2:27 AM  by Yemi Moore. Apgars were 8  and 9 . Additional Diagnoses:   Hospital Problems as of 2020 Date Reviewed: 2020          Codes Class Noted - Resolved POA    Post-term pregnancy, 40-42 weeks of gestation ICD-10-CM: O48.0  ICD-9-CM: 645.10  2020 - Present Unknown        40 weeks gestation of pregnancy ICD-10-CM: Z3A.40  ICD-9-CM: V22.2  2020 - Present Unknown        * (Principal) Encounter for planned induction of labor ICD-10-CM: Z34.90  ICD-9-CM: V22.1  2020 - Present Unknown             Lab Results   Component Value Date/Time    ABO/Rh(D) O POSITIVE 2020 06:58 PM    Rubella, External Immune 2019    GrBStrep, External Negative 2020    There is no immunization history for the selected administration types on file for this patient.     * Procedures:   * No surgery found *      Richey  Depression Scale  I have been able to laugh and see the funny side of things: As much as I always could  I have looked forward with enjoyment to things: As much as I ever did  I have blamed myself unnecessarily when things went wrong: Yes, some of the time  I have been anxious or worried for no good reason: Hardly ever  I have felt scared or panicky for no very good reason: No, not much  Things have been getting on top of me: No, I have been coping as well as ever  I have been so unhappy that I have had difficulty sleeping: No, not at all  I have felt sad or miserable: No, not at all  I have been so unhappy that I have been crying: Only occasionally  The thought of harming myself has occurred to me: Never  Total Score: 5    * Discharge Condition: good and stable    * Hospital Course: Normal hospital course following the delivery. Delivered early am yesteday so had full day yesterday, so feel can go home later today will be like two days, precautions covered    * Disposition: Home    Discharge Medications:   Current Discharge Medication List      START taking these medications    Details   ibuprofen (MOTRIN) 600 mg tablet Take 1 Tab by mouth every six (6) hours as needed for Pain. Qty: 20 Tab, Refills: 1      oxyCODONE-acetaminophen (PERCOCET) 5-325 mg per tablet Take 1 Tab by mouth every six (6) hours as needed for Pain for up to 7 days. Max Daily Amount: 4 Tabs. Qty: 20 Tab, Refills: 0    Associated Diagnoses:  (spontaneous vaginal delivery)         CONTINUE these medications which have NOT CHANGED    Details   ondansetron hcl (Zofran) 8 mg tablet Take 8 mg by mouth every eight (8) hours as needed for Nausea or Vomiting. PNV No12-Iron-FA-DSS-OM-3 29 mg iron-1 mg -50 mg CPKD Take  by mouth. * Follow-up Care/Patient Instructions: Activity: Activity as tolerated  Diet: Regular Diet  Wound Care: As directed    Follow-up Information     Follow up With Specialties Details Why Contact Info    Other, MD Emily    Patient can only remember the practice name and not the physician                                                   Post-Partum Day Number 1+ Progress/Discharge Note    Patient doing well post-partum without significant complaint. Voiding without difficulty, normal lochia, positive flatus.     Vitals:    Patient Vitals for the past 8 hrs:   BP Temp Pulse Resp SpO2   20 0707 127/85 97.7 °F (36.5 °C) 62 16 97 %     Temp (24hrs), Av.6 °F (36.4 °C), Min:97.4 °F (36.3 °C), Max:97.7 °F (36.5 °C)      Vital signs stable, afebrile. Exam:  Patient without distress. Abdomen soft, fundus firm at level of umbilicus, non tender               Perineum with normal lochia noted. Lower extremities are negative for swelling, cords or tenderness. Lab/Data Review: All lab results for the last 24 hours reviewed. Assessment and Plan:  Patient appears to be having uncomplicated post-partum course. Continue routine perineal care and maternal education. Plan discharge for today with follow up in our office in 6 weeks.

## 2020-06-11 NOTE — DISCHARGE INSTRUCTIONS
Patient Education   Patient Education        Vaginal Childbirth: Care Instructions  Your Care Instructions     Your body will slowly heal in the next few weeks. It is easy to get too tired and overwhelmed during the first weeks after your baby is born. Changes in your hormones can shift your mood without warning. You may find it hard to meet the extra demands on your energy and time. Take it easy on yourself. Follow-up care is a key part of your treatment and safety. Be sure to make and go to all appointments, and call your doctor if you are having problems. It's also a good idea to know your test results and keep a list of the medicines you take. How can you care for yourself at home? · Vaginal bleeding and cramps  ? After delivery, you will have a bloody discharge from the vagina. This will turn pink within a week and then white or yellow after about 10 days. It may last for 2 to 4 weeks or longer, until the uterus has healed. Use pads instead of tampons until you stop bleeding. ? Do not worry if you pass some blood clots, as long as they are smaller than a golf ball. If you have a tear or stitches in your vaginal area, change the pad at least every 4 hours to prevent soreness and infection. ? You may have cramps for the first few days after childbirth. These are normal and occur as the uterus shrinks to normal size. Take an over-the-counter pain medicine, such as acetaminophen (Tylenol), ibuprofen (Advil, Motrin), or naproxen (Aleve), for cramps. Read and follow all instructions on the label. Do not take aspirin, because it can cause more bleeding. ? Do not take two or more pain medicines at the same time unless the doctor told you to. Many pain medicines have acetaminophen, which is Tylenol. Too much acetaminophen (Tylenol) can be harmful. · Stitches  ? If you have stitches, they will dissolve on their own and do not need to be removed.  Follow your doctor's instructions for cleaning the stitched area.  ? Put ice or a cold pack on your painful area for 10 to 20 minutes at a time, several times a day, for the first few days. Put a thin cloth between the ice and your skin. ? Sit in a few inches of warm water (sitz bath) 3 times a day and after bowel movements. The warm water helps with pain and itching. If you do not have a tub, a warm shower might help. · Breast fullness  ? Your breasts may overfill (engorge) in the first few days after delivery. To help milk flow and to relieve pain, warm your breasts in the shower or by using warm, moist towels before nursing. ? If you are not nursing, do not put warmth on your breasts or touch your breasts. Wear a tight bra or sports bra and use ice until the fullness goes away. This usually takes 2 to 3 days. ? Put ice or a cold pack on your breast after nursing to reduce swelling and pain. Put a thin cloth between the ice and your skin. · Activity  ? Eat a balanced diet. Do not try to lose weight by cutting calories. Keep taking your prenatal vitamins, or take a multivitamin. ? Get as much rest as you can. Try to take naps when your baby sleeps during the day. ? Get some exercise every day. But do not do any heavy exercise until your doctor says it is okay. ? Wait until you are healed (about 4 to 6 weeks) before you have sexual intercourse. Your doctor will tell you when it is okay to have sex. ? Talk to your doctor about birth control. You can get pregnant even before your period returns. Also, you can get pregnant while you are breastfeeding. · Mental health  ? It is normal to have some sadness, anxiety, sleeplessness, and mood swings after you go home. If you feel upset or hopeless for more than a few days or are having trouble doing the things you need to do, talk to your doctor. · Constipation and hemorrhoids  ? Drink plenty of fluids, enough so that your urine is light yellow or clear like water.  If you have kidney, heart, or liver disease and have to limit fluids, talk with your doctor before you increase the amount of fluids you drink. ? Eat plenty of fiber each day. Have a bran muffin or bran cereal for breakfast, and try eating a piece of fruit for a mid-afternoon snack. ? For painful, itchy hemorrhoids, put ice or a cold pack on the area several times a day for 10 minutes at a time. Follow this by putting a warm compress on the area for another 10 to 20 minutes or by sitting in a shallow, warm bath. When should you call for help? Call  911 anytime you think you may need emergency care. For example, call if:  · You have thoughts of harming yourself, your baby, or another person. · You passed out (lost consciousness). · You have chest pain, are short of breath, or cough up blood. · You have a seizure. Call your doctor now or seek immediate medical care if:  · You have severe vaginal bleeding. · You are dizzy or lightheaded, or you feel like you may faint. · You have a fever. · You have new or more pain in your belly or pelvis. · You have symptoms of a blood clot in your leg (called a deep vein thrombosis), such as:  ? Pain in the calf, back of the knee, thigh, or groin. ? Redness and swelling in your leg or groin. · You have signs of preeclampsia, such as:  ? Sudden swelling of your face, hands, or feet. ? New vision problems (such as dimness, blurring, or seeing spots). ? A severe headache. Watch closely for changes in your health, and be sure to contact your doctor if:  · Your vaginal bleeding seems to be getting heavier. · You have new or worse vaginal discharge. · You feel sad, anxious, or hopeless for more than a few days. · You do not get better as expected. Where can you learn more? Go to http://harini-natacha.info/  Enter Q237 in the search box to learn more about \"Vaginal Childbirth: Care Instructions. \"  Current as of: February 11, 2020               Content Version: 12.5  © 8326-2285 Healthwise, Incorporated. Care instructions adapted under license by [employer] (which disclaims liability or warranty for this information). If you have questions about a medical condition or this instruction, always ask your healthcare professional. [address] any warranty or liability for your use of this information. After Your Delivery (the Postpartum Period): Care Instructions  Your Care Instructions    Congratulations on the birth of your baby. Like pregnancy, the  period can be a time of excitement, veronica, and exhaustion. You may look at your wondrous little baby and feel happy. You may also be overwhelmed by your new sleep hours and new responsibilities. At first, babies often sleep during the days and are awake at night. They do not have a pattern or routine. They may make sudden gasps, jerk themselves awake, or look like they have crossed eyes. These are all normal, and they may even make you smile. In these first weeks after delivery, try to take good care of yourself. It may take 4 to 6 weeks to feel like yourself again, and possibly longer if you had a  birth. You will likely feel very tired for several weeks. Your days will be full of ups and downs, but lots of veronica as well. Follow-up care is a key part of your treatment and safety. Be sure to make and go to all appointments, and call your doctor if you are having problems. It's also a good idea to know your test results and keep a list of the medicines you take. How can you care for yourself at home? Take care of your body after delivery  · Use pads instead of tampons for the bloody flow that may last as long as 2 weeks. · Ease cramps with ibuprofen (Advil, Motrin). · Ease soreness of hemorrhoids and the area between your vagina and rectum with ice compresses or witch hazel pads. · Ease constipation by drinking lots of fluid and eating high-fiber foods.  Ask your doctor about over-the-counter stool softeners. · Cleanse yourself with a gentle squeeze of warm water from a bottle instead of wiping with toilet paper. · Take a sitz bath in warm water several times a day. · Wear a good nursing bra. Ease sore and swollen breasts with warm, wet washcloths. · If you are not breastfeeding, use ice rather than heat for breast soreness. · Your period may not start for several months if you are breastfeeding. You may bleed more, and longer at first, than you did before you got pregnant. · Wait until you are healed (about 4 to 6 weeks) before you have sexual intercourse. Your doctor will tell you when it is okay to have sex. · Try not to travel with your baby for 5 or 6 weeks. If you take a long car trip, make frequent stops to walk around and stretch. Avoid exhaustion  · Rest every day. Try to nap when your baby naps. · Ask another adult to be with you for a few days after delivery. · Plan for  if you have other children. · Stay flexible so you can eat at odd hours and sleep when you need to. Both you and your baby are making new schedules. · Plan small trips to get out of the house. Change can make you feel less tired. · Ask for help with housework, cooking, and shopping. Remind yourself that your job is to care for your baby. Know about help for postpartum depression  · \"Baby blues\" are common for the first 1 to 2 weeks after birth. You may cry or feel sad or irritable for no reason. · Rest whenever you can. Being tired makes it harder to handle your emotions. · Go for walks with your baby. · Talk to your partner, friends, and family about your feelings. · If your symptoms last for more than a few weeks, or if you feel very depressed, ask your doctor for help. · Postpartum depression can be treated. Support groups and counseling can help. Sometimes medicine can also help. Stay healthy  · Eat healthy foods so you have more energy and lose extra baby pounds. · If you breastfeed, avoid drugs. If you quit smoking during pregnancy, try to stay smoke-free. If you choose to have a drink now and then, have only one drink, and limit the number of occasions that you have a drink. Wait to breastfeed at least 2 hours after you have a drink to reduce the amount of alcohol the baby may get in the milk. · Start daily exercise after 4 to 6 weeks, but rest when you feel tired. · Learn exercises to tone your belly. Do Kegel exercises to regain strength in your pelvic muscles. You can do these exercises while you stand or sit. ? Squeeze the same muscles you would use to stop your urine. Your belly and thighs should not move. ? Hold the squeeze for 3 seconds, and then relax for 3 seconds. ? Start with 3 seconds. Then add 1 second each week until you are able to squeeze for 10 seconds. ? Repeat the exercise 10 to 15 times for each session. Do three or more sessions each day. · Find a class for new mothers and new babies that has an exercise time. · If you had a  birth, give yourself a bit more time before you exercise, and be careful. When should you call for help? Call  911 anytime you think you may need emergency care. For example, call if:    · You have thoughts of harming yourself, your baby, or another person.     · You passed out (lost consciousness).     · You have chest pain, are short of breath, or cough up blood.     · You have a seizure.    Call your doctor now or seek immediate medical care if:    · You have severe vaginal bleeding. This means you are passing blood clots and soaking through a pad each hour for 2 or more hours.     · You are dizzy or lightheaded, or you feel like you may faint.     · You have a fever.     · You have new or more belly pain.     · You have signs of a blood clot in your leg (called a deep vein thrombosis), such as:  ? Pain in the calf, back of the knee, thigh, or groin. ?  Redness and swelling in your leg or groin.     · You have signs of preeclampsia, such as:  ? Sudden swelling of your face, hands, or feet. ? New vision problems (such as dimness, blurring, or seeing spots). ? A severe headache.    Watch closely for changes in your health, and be sure to contact your doctor if:    · Your vaginal bleeding seems to be getting heavier.     · You have new or worse vaginal discharge.     · You feel sad, anxious, or hopeless for more than a few days.     · You do not get better as expected. Where can you learn more? Go to http://harini-natacha.info/  Enter A461 in the search box to learn more about \"After Your Delivery (the Postpartum Period): Care Instructions. \"  Current as of: May 29, 2019Content Version: 12.4  © 4147-2629 Healthwise, Incorporated. Care instructions adapted under license by [employer] (which disclaims liability or warranty for this information). If you have questions about a medical condition or this instruction, always ask your healthcare professional. [place] [address] any warranty or liability for your use of this information.

## 2020-06-11 NOTE — LACTATION NOTE
Mom and baby are going home today. Continue to offer the breast without restriction. Mom's milk should be fully in over the next few days. Reviewed engorgement precautions. Hand Expression has been demoed and written hand-out reviewed. As milk comes in baby will be more alert at the breast and swallows will be more obvious. Breasts may feel softer once baby has finished nursing. Baby should be back to birth weight by 3weeks of age. And then gain on average 1 oz per day for the next 2-3 months. Reviewed babies should be exclusively breastfeeding for the first 6 months and that breastfeeding should continue after introduction of appropriate complimentary foods after 6 months. Initial output should be at least 1 wet and 1 bowel movement for each day old baby is. By day 5-7 once milk is fully in baby will consistently have 6 or more soaking wet diapers and about 4 bowel movement. Some babies have a bowel movement with every feeding and some have 1-3 large bowel movements each day. Inadequate output may indicate inadequate feedings and should be reported to your Pediatrician. Bowel habits may change as baby gets older. Encouraged follow-up at Pediatrician in 1-2 days, no later than 1 week of age. Call [facility] for any questions as needed or to set up an OP visit. OP phone calls are returned within 24 hours. Community Breastfeeding Resource List given.

## 2020-06-11 NOTE — LACTATION NOTE
In to see mom and infant prior to discharge to home. Mom stated that she has continued to pump and her volume has decreased to 5 ml per pumping. She has been feeding infant formula supplement as well. Informed mom that it is normal for volume to decrease after initial bolus but as she continues to pump it will start to increase as well. Reviewed discharge information. Mom and infant are following up with [facility] and will see lactation consultant there.

## 2020-06-11 NOTE — PROGRESS NOTES
SW met with pt prior to dc. FOB present, pt states she has all infant needs. Pt given PCP list and encouraged to get established with a physician for routine care. Pt given PPD packet. Pt declined [address]. Plans are for dc today. No additional needs or questions identified at the time of assessment.   SRINIVASAN Bruce

## 2020-06-11 NOTE — PROGRESS NOTES
Post-Partum Day Number 1 Progress Note    Patient doing well post-partum without significant complaint. Voiding withour difficulty, normal lochia. Vitals:    Patient Vitals for the past 8 hrs:   BP Temp Pulse Resp SpO2   20 0707 127/85 97.7 °F (36.5 °C) 62 16 97 %     Temp (24hrs), Av.6 °F (36.4 °C), Min:97.4 °F (36.3 °C), Max:97.7 °F (36.5 °C)      Vital signs stable, afebrile. Exam:  Patient without distress. Abdomen soft, fundus firm at level of umbilicus, nontender               Perineum with normal lochia noted. Lower extremities are negative for swelling, cords or tenderness. Lab/Data Review: All lab results for the last 24 hours reviewed. Assessment and Plan:  Patient appears to be having uncomplicated post-partum course. Continue routine perineal care and maternal education. Plan discharge tomorrow if no problems occur.